# Patient Record
Sex: MALE | Race: WHITE | URBAN - METROPOLITAN AREA
[De-identification: names, ages, dates, MRNs, and addresses within clinical notes are randomized per-mention and may not be internally consistent; named-entity substitution may affect disease eponyms.]

---

## 2017-10-10 LAB
BILIRUB SERPL-MCNC: NEGATIVE MG/DL
BLOOD URINE, POC: NEGATIVE
CLARITY, POC UA: CLEAR
COLOR, POC UA: NORMAL
GLUCOSE UR QL STRIP: NEGATIVE
KETONES UR QL STRIP: NEGATIVE
LEUKOCYTE EST, POC UA: NEGATIVE
NITRITE, POC UA: NEGATIVE
PH, POC UA: 6
PROTEIN, POC: NEGATIVE
SPECIFIC GRAVITY, POC UA: 1
UROBILINOGEN, POC UA: NORMAL

## 2020-07-08 ENCOUNTER — HISTORICAL (OUTPATIENT)
Dept: ADMINISTRATIVE | Facility: HOSPITAL | Age: 45
End: 2020-07-08

## 2020-07-16 ENCOUNTER — HISTORICAL (OUTPATIENT)
Dept: RADIOLOGY | Facility: HOSPITAL | Age: 45
End: 2020-07-16

## 2021-05-14 ENCOUNTER — HOSPITAL ENCOUNTER (OUTPATIENT)
Dept: MEDSURG UNIT | Facility: HOSPITAL | Age: 46
End: 2021-05-15
Attending: INTERNAL MEDICINE | Admitting: INTERNAL MEDICINE

## 2021-05-14 LAB
ABS NEUT (OLG): 12.88 X10(3)/MCL (ref 2.1–9.2)
ALBUMIN SERPL-MCNC: 1.6 GM/DL (ref 3.5–5)
ALBUMIN/GLOB SERPL: 0.4 RATIO (ref 1.1–2)
ALP SERPL-CCNC: 230 UNIT/L (ref 40–150)
ALP SERPL-CCNC: 239 UNIT/L (ref 40–150)
ALP SERPL-CCNC: 241 UNIT/L (ref 40–150)
ALT SERPL-CCNC: 104 UNIT/L (ref 0–55)
ALT SERPL-CCNC: 88 UNIT/L (ref 0–55)
ALT SERPL-CCNC: 97 UNIT/L (ref 0–55)
AMPHET UR QL SCN: NEGATIVE
APPEARANCE, UA: ABNORMAL
AST SERPL-CCNC: 84 UNIT/L (ref 5–34)
AST SERPL-CCNC: 92 UNIT/L (ref 5–34)
AST SERPL-CCNC: 98 UNIT/L (ref 5–34)
BACTERIA #/AREA URNS AUTO: ABNORMAL /HPF
BARBITURATE SCN PRESENT UR: NEGATIVE
BASOPHILS # BLD AUTO: 0 X10(3)/MCL (ref 0–0.2)
BASOPHILS NFR BLD AUTO: 0 %
BENZODIAZ UR QL SCN: NEGATIVE
BILIRUB SERPL-MCNC: 2.1 MG/DL
BILIRUB SERPL-MCNC: 2.2 MG/DL
BILIRUB SERPL-MCNC: 2.3 MG/DL
BILIRUB UR QL STRIP: NEGATIVE
BILIRUBIN DIRECT+TOT PNL SERPL-MCNC: 0.5 MG/DL (ref 0–0.8)
BILIRUBIN DIRECT+TOT PNL SERPL-MCNC: 0.5 MG/DL (ref 0–0.8)
BILIRUBIN DIRECT+TOT PNL SERPL-MCNC: 0.8 MG/DL (ref 0–0.8)
BILIRUBIN DIRECT+TOT PNL SERPL-MCNC: 1.4 MG/DL (ref 0–0.5)
BILIRUBIN DIRECT+TOT PNL SERPL-MCNC: 1.6 MG/DL (ref 0–0.5)
BILIRUBIN DIRECT+TOT PNL SERPL-MCNC: 1.8 MG/DL (ref 0–0.5)
BUN SERPL-MCNC: 29.9 MG/DL (ref 8.9–20.6)
BUN SERPL-MCNC: 30.1 MG/DL (ref 8.9–20.6)
BUN SERPL-MCNC: 32 MG/DL (ref 8.9–20.6)
CALCIUM SERPL-MCNC: 7 MG/DL (ref 8.4–10.2)
CALCIUM SERPL-MCNC: 7.1 MG/DL (ref 8.4–10.2)
CALCIUM SERPL-MCNC: 7.1 MG/DL (ref 8.4–10.2)
CANNABINOIDS UR QL SCN: NEGATIVE
CHLORIDE SERPL-SCNC: 88 MMOL/L (ref 98–107)
CHLORIDE SERPL-SCNC: 92 MMOL/L (ref 98–107)
CHLORIDE SERPL-SCNC: 94 MMOL/L (ref 98–107)
CO2 SERPL-SCNC: 20 MMOL/L (ref 22–29)
CO2 SERPL-SCNC: 22 MMOL/L (ref 22–29)
CO2 SERPL-SCNC: 22 MMOL/L (ref 22–29)
COCAINE UR QL SCN: NEGATIVE
COLOR UR: YELLOW
CREAT SERPL-MCNC: 1.1 MG/DL (ref 0.73–1.18)
CREAT SERPL-MCNC: 1.13 MG/DL (ref 0.73–1.18)
CREAT SERPL-MCNC: 1.23 MG/DL (ref 0.73–1.18)
EOSINOPHIL # BLD AUTO: 0 X10(3)/MCL (ref 0–0.9)
EOSINOPHIL NFR BLD AUTO: 0 %
ERYTHROCYTE [DISTWIDTH] IN BLOOD BY AUTOMATED COUNT: 14.6 % (ref 11.5–14.5)
ETHANOL SERPL-MCNC: <10 MG/DL
GLOBULIN SER-MCNC: 3.7 GM/DL (ref 2.4–3.5)
GLOBULIN SER-MCNC: 3.9 GM/DL (ref 2.4–3.5)
GLOBULIN SER-MCNC: 4 GM/DL (ref 2.4–3.5)
GLUCOSE (UA): NEGATIVE
GLUCOSE SERPL-MCNC: 84 MG/DL (ref 74–100)
GLUCOSE SERPL-MCNC: 92 MG/DL (ref 74–100)
GLUCOSE SERPL-MCNC: 93 MG/DL (ref 74–100)
HCO3 UR-SCNC: 22.9 MMOL/L (ref 22–26)
HCT VFR BLD AUTO: 29.8 % (ref 40–51)
HGB BLD-MCNC: 10.2 GM/DL (ref 13.5–17.5)
HGB UR QL STRIP: 0.5
HYALINE CASTS #/AREA URNS LPF: ABNORMAL /LPF
IMM GRANULOCYTES # BLD AUTO: 0.44 10*3/UL
IMM GRANULOCYTES NFR BLD AUTO: 3 %
KETONES UR QL STRIP: NEGATIVE
LACTATE SERPL-SCNC: 1.8 MMOL/L (ref 0.5–2.2)
LEUKOCYTE ESTERASE UR QL STRIP: 75 LEU/UL
LYMPHOCYTES # BLD AUTO: 1.2 X10(3)/MCL (ref 0.6–4.6)
LYMPHOCYTES NFR BLD AUTO: 8 %
MAGNESIUM SERPL-MCNC: 2.2 MG/DL (ref 1.6–2.6)
MCH RBC QN AUTO: 25.6 PG (ref 26–34)
MCHC RBC AUTO-ENTMCNC: 34.2 GM/DL (ref 31–37)
MCV RBC AUTO: 74.7 FL (ref 80–100)
MONOCYTES # BLD AUTO: 0.9 X10(3)/MCL (ref 0.1–1.3)
MONOCYTES NFR BLD AUTO: 6 %
NEUTROPHILS # BLD AUTO: 12.88 X10(3)/MCL (ref 2.1–9.2)
NEUTROPHILS NFR BLD AUTO: 83 %
NITRITE UR QL STRIP: NEGATIVE
O2 HGB ARTERIAL: 96.4 % (ref 94–100)
OPIATES UR QL SCN: POSITIVE
PCO2 BLDA: 28 MMHG (ref 35–45)
PCP UR QL: NEGATIVE
PH SMN: 7.52 [PH] (ref 7.35–7.45)
PH UR STRIP.AUTO: 5.5 [PH] (ref 3–11)
PH UR STRIP: 5.5 [PH] (ref 4.5–8)
PHOSPHATE SERPL-MCNC: 3.6 MG/DL (ref 2.3–4.7)
PLATELET # BLD AUTO: 174 X10(3)/MCL (ref 130–400)
PMV BLD AUTO: 10.9 FL (ref 7.4–10.4)
PO2 BLDA: 84 MMHG (ref 80–100)
POC ALLENS TEST: POSITIVE
POC BE: 0.6 (ref -2–2)
POC CO HGB: 1.5 %
POC CO2: 23.8 MMOL/L (ref 22–26)
POC MET HGB: 0.9 % (ref 0.4–1.5)
POC SAMPLESOURCE: ABNORMAL
POC SATURATED O2: 98.8 %
POC SITE: ABNORMAL
POC THB: 10 GM/DL (ref 12–16)
POC TREATMENT: ABNORMAL
POTASSIUM SERPL-SCNC: 3.7 MMOL/L (ref 3.5–5.1)
POTASSIUM SERPL-SCNC: 3.8 MMOL/L (ref 3.5–5.1)
POTASSIUM SERPL-SCNC: 4.1 MMOL/L (ref 3.5–5.1)
PROT SERPL-MCNC: 5.3 GM/DL (ref 6.4–8.3)
PROT SERPL-MCNC: 5.5 GM/DL (ref 6.4–8.3)
PROT SERPL-MCNC: 5.6 GM/DL (ref 6.4–8.3)
PROT UR QL STRIP: 20 MG/DL
RBC # BLD AUTO: 3.99 X10(6)/MCL (ref 4.5–5.9)
RBC #/AREA URNS AUTO: ABNORMAL /HPF
SARS-COV-2 AG RESP QL IA.RAPID: NEGATIVE
SODIUM SERPL-SCNC: 121 MMOL/L (ref 136–145)
SODIUM SERPL-SCNC: 123 MMOL/L (ref 136–145)
SODIUM SERPL-SCNC: 124 MMOL/L (ref 136–145)
SP GR UR STRIP: 1 (ref 1–1.03)
SQUAMOUS #/AREA URNS LPF: ABNORMAL /LPF
TROPONIN I SERPL-MCNC: 0.21 NG/ML (ref 0–0.04)
TROPONIN I SERPL-MCNC: 0.27 NG/ML (ref 0–0.04)
UROBILINOGEN UR STRIP-ACNC: 2 MG/DL
WBC # SPEC AUTO: 15.4 X10(3)/MCL (ref 4.5–11)
WBC #/AREA URNS AUTO: ABNORMAL /HPF

## 2021-05-15 LAB
ABS NEUT (OLG): 13.56 X10(3)/MCL (ref 2.1–9.2)
ALBUMIN % SPEP (OHS): 32.42 % (ref 48.1–59.5)
ALBUMIN SERPL-MCNC: 1.6 GM/DL (ref 3.5–5)
ALBUMIN/GLOB SERPL: 0.4 RATIO (ref 1.1–2)
ALP SERPL-CCNC: 183 UNIT/L (ref 40–150)
ALP SERPL-CCNC: 196 UNIT/L (ref 40–150)
ALP SERPL-CCNC: 208 UNIT/L (ref 40–150)
ALPHA 1 GLOB (OHS): 0.42 GM/DL (ref 0–0.4)
ALPHA 1 GLOB% (OHS): 7.48 % (ref 2.3–4.9)
ALPHA 2 GLOB % (OHS): 16.1 % (ref 6.9–13)
ALPHA 2 GLOB (OHS): 0.9 GM/DL (ref 0.4–1)
ALT SERPL-CCNC: 65 UNIT/L (ref 0–55)
ALT SERPL-CCNC: 70 UNIT/L (ref 0–55)
ALT SERPL-CCNC: 81 UNIT/L (ref 0–55)
AMMONIA PLAS-MSCNC: 32 UMOL/L (ref 18–72)
APTT PPP: 37.3 SECOND(S) (ref 23.3–37)
AST SERPL-CCNC: 63 UNIT/L (ref 5–34)
AST SERPL-CCNC: 64 UNIT/L (ref 5–34)
AST SERPL-CCNC: 72 UNIT/L (ref 5–34)
BASOPHILS NFR BLD MANUAL: 0 %
BETA GLOB% (OHS): 14.04 % (ref 13.8–19.7)
BILIRUB SERPL-MCNC: 1.8 MG/DL
BILIRUB SERPL-MCNC: 1.8 MG/DL
BILIRUB SERPL-MCNC: 1.9 MG/DL
BILIRUBIN DIRECT+TOT PNL SERPL-MCNC: 0.5 MG/DL (ref 0–0.8)
BILIRUBIN DIRECT+TOT PNL SERPL-MCNC: 0.5 MG/DL (ref 0–0.8)
BILIRUBIN DIRECT+TOT PNL SERPL-MCNC: 0.7 MG/DL (ref 0–0.8)
BILIRUBIN DIRECT+TOT PNL SERPL-MCNC: 1.2 MG/DL (ref 0–0.5)
BILIRUBIN DIRECT+TOT PNL SERPL-MCNC: 1.3 MG/DL (ref 0–0.5)
BILIRUBIN DIRECT+TOT PNL SERPL-MCNC: 1.3 MG/DL (ref 0–0.5)
BUN SERPL-MCNC: 24.1 MG/DL (ref 8.9–20.6)
BUN SERPL-MCNC: 25.3 MG/DL (ref 8.9–20.6)
BUN SERPL-MCNC: 28.3 MG/DL (ref 8.9–20.6)
CALCIUM SERPL-MCNC: 6.9 MG/DL (ref 8.4–10.2)
CALCIUM SERPL-MCNC: 7.1 MG/DL (ref 8.4–10.2)
CALCIUM SERPL-MCNC: 7.2 MG/DL (ref 8.4–10.2)
CHLORIDE SERPL-SCNC: 94 MMOL/L (ref 98–107)
CHLORIDE SERPL-SCNC: 97 MMOL/L (ref 98–107)
CHLORIDE SERPL-SCNC: 99 MMOL/L (ref 98–107)
CHLORIDE UR-SCNC: <20 MMOL/L
CO2 SERPL-SCNC: 15 MMOL/L (ref 22–29)
CO2 SERPL-SCNC: 21 MMOL/L (ref 22–29)
CO2 SERPL-SCNC: 21 MMOL/L (ref 22–29)
CREAT SERPL-MCNC: 0.96 MG/DL (ref 0.73–1.18)
CREAT SERPL-MCNC: 0.98 MG/DL (ref 0.73–1.18)
CREAT SERPL-MCNC: 1 MG/DL (ref 0.73–1.18)
CREAT SERPL-MCNC: 1.07 MG/DL (ref 0.73–1.18)
CREAT UR-MCNC: 44.7 MG/DL (ref 58–161)
EOSINOPHIL NFR BLD MANUAL: 0 %
ERYTHROCYTE [DISTWIDTH] IN BLOOD BY AUTOMATED COUNT: 15 % (ref 11.5–14.5)
FESODIUM % (OHS): <1 %
GAMMA GLOBULIN % (OHS): 29.97 % (ref 10.1–21.9)
GAMMA GLOBULIN (OHS): 1.68 GM/DL (ref 0.4–1.8)
GLOBULIN SER-MCNC: 3.7 GM/DL (ref 2.4–3.5)
GLOBULIN SER-MCNC: 3.8 GM/DL (ref 2.4–3.5)
GLOBULIN SER-MCNC: 3.9 GM/DL (ref 2.4–3.5)
GLOBULIN SER-MCNC: 4 GM/DL (ref 2.4–3.5)
GLUCOSE SERPL-MCNC: 100 MG/DL (ref 74–100)
GLUCOSE SERPL-MCNC: 119 MG/DL (ref 74–100)
GLUCOSE SERPL-MCNC: 121 MG/DL (ref 74–100)
GRANULOCYTES NFR BLD MANUAL: 86 % (ref 43–75)
HAV IGM SERPL QL IA: NONREACTIVE
HBV CORE IGM SERPL QL IA: NONREACTIVE
HBV SURFACE AG SERPL QL IA: REACTIVE
HBV SURFACE AG SERPLBLD QL IA.RAPID: NORMAL
HCO3 UR-SCNC: 19.9 MMOL/L (ref 22–26)
HCT VFR BLD AUTO: 31.7 % (ref 40–51)
HCV AB SERPL QL IA: REACTIVE
HGB BLD-MCNC: 10.6 GM/DL (ref 13.5–17.5)
HIV 1+2 AB+HIV1 P24 AG SERPL QL IA: NONREACTIVE
IGA SERPL-MCNC: 241 MG/DL (ref 63–484)
IGG SERPL-MCNC: 1377 MG/DL (ref 240–1822)
IGM SERPL-MCNC: 234 MG/DL (ref 22–240)
LACTATE SERPL-SCNC: 1.8 MMOL/L (ref 0.5–2.2)
LYMPHOCYTES NFR BLD MANUAL: 8 % (ref 20.5–51.1)
M SPIKE % (OHS): 12.35 %
M SPIKE (OHS): 0.69 GM/DL
MCH RBC QN AUTO: 25.4 PG (ref 26–34)
MCHC RBC AUTO-ENTMCNC: 33.4 GM/DL (ref 31–37)
MCV RBC AUTO: 75.8 FL (ref 80–100)
MONOCYTES NFR BLD MANUAL: 6 % (ref 2–9)
O2 HGB ARTERIAL: 96.1 % (ref 94–100)
OSMOLALITY SERPL: 266 MOSM/KG (ref 280–300)
OSMOLALITY UR: 279 MOSM/KG (ref 300–1300)
PCO2 BLDA: 25 MMHG (ref 35–45)
PH SMN: 7.51 [PH] (ref 7.35–7.45)
PLATELET # BLD AUTO: 124 X10(3)/MCL (ref 130–400)
PLATELET # BLD EST: ABNORMAL 10*3/UL
PMV BLD AUTO: ABNORMAL FL (ref 7.4–10.4)
PO2 BLDA: 72 MMHG (ref 80–100)
POC ALLENS TEST: POSITIVE
POC BE: -2.1 (ref -2–2)
POC CO HGB: 2.6 %
POC CO2: 20.7 MMOL/L (ref 22–26)
POC MET HGB: 0.6 % (ref 0.4–1.5)
POC SAMPLESOURCE: ABNORMAL
POC SATURATED O2: 99.3 %
POC SITE: ABNORMAL
POC THB: 9.9 GM/DL (ref 12–16)
POC TREATMENT: ABNORMAL
POTASSIUM SERPL-SCNC: 3.6 MMOL/L (ref 3.5–5.1)
POTASSIUM SERPL-SCNC: 3.6 MMOL/L (ref 3.5–5.1)
POTASSIUM SERPL-SCNC: 4.3 MMOL/L (ref 3.5–5.1)
PROT SERPL-MCNC: 5.3 GM/DL (ref 6.4–8.3)
PROT SERPL-MCNC: 5.4 GM/DL (ref 6.4–8.3)
PROT SERPL-MCNC: 5.5 GM/DL (ref 6.4–8.3)
PROT SERPL-MCNC: 5.6 GM/DL (ref 6.4–8.3)
RBC # BLD AUTO: 4.18 X10(6)/MCL (ref 4.5–5.9)
RBC MORPH BLD: NORMAL
SODIUM SERPL-SCNC: 124 MMOL/L (ref 136–145)
SODIUM SERPL-SCNC: 124 MMOL/L (ref 136–145)
SODIUM SERPL-SCNC: 126 MMOL/L (ref 136–145)
SODIUM SERPL-SCNC: 129 MMOL/L (ref 136–145)
SODIUM UR-SCNC: <20 MMOL/L
SODIUM UR-SCNC: <20 MMOL/L
SPE INTERPRETATION (OHS): ABNORMAL
T PALLIDUM AB SER QL: NONREACTIVE
TROPONIN I SERPL-MCNC: 0.17 NG/ML (ref 0–0.04)
TROPONIN I SERPL-MCNC: 0.17 NG/ML (ref 0–0.04)
TSH SERPL-ACNC: 0.76 UIU/ML (ref 0.35–4.94)
VANCOMYCIN SERPL-MCNC: 14.8 UG/ML (ref 15–20)
WBC # SPEC AUTO: 16.4 X10(3)/MCL (ref 4.5–11)

## 2022-04-11 ENCOUNTER — HISTORICAL (OUTPATIENT)
Dept: ADMINISTRATIVE | Facility: HOSPITAL | Age: 47
End: 2022-04-11

## 2022-04-27 VITALS
HEIGHT: 68 IN | SYSTOLIC BLOOD PRESSURE: 154 MMHG | DIASTOLIC BLOOD PRESSURE: 114 MMHG | BODY MASS INDEX: 32.74 KG/M2 | OXYGEN SATURATION: 100 % | WEIGHT: 216 LBS

## 2022-04-30 NOTE — ED PROVIDER NOTES
Patient:   Daniel Gardner            MRN: [id number]            FIN: [id number]               Age:   45 years     Sex:  Male     :  1975   Associated Diagnoses:   Polysubstance abuse; Hyponatremia; Anemia; Abnormal LFTs (liver function tests)   Author:   Kaleb CALLE, Yoshi Stanton      Basic Information   Time seen: Date & time 2021 11:00:00.   History source: Patient, EMS.   Arrival mode: Ambulance.   History limitation: Clinical condition.   Additional information: Chief Complaint from Nursing Triage Note : Chief Complaint   2021 10:37 CDT      Chief Complaint           Pt reports SOB, nausea, and hasn't used drugs a few days.  .      History of Present Illness   The patient presents with drug withdrawal.  Poor historian, regular user of inhaled methamphetamine and injected heroin, uncertain if the use drugs earlier today, apparently lives with his mother who called for ambulance assistance based on unspecified problems at home.  The patient is alert on arrival with signs of intoxication but denies specific complaints.  Blood pressure noted initially borderline but there was no report of decreased responsiveness, there is some report of dyspnea, nausea, and he seems confused.  Denies fever, chills, pain, nausea at present, dyspnea at present, or other specific complaints.  Denies intent for self-harm.  Denies hallucination or delusion..        Review of Systems   Constitutional symptoms:  Negative except as documented in HPI, no fever, no chills, no weakness.    Skin symptoms:  Negative except as documented in HPI, no rash, no breakdown.    Eye symptoms:  Negative except as documented in HPI, no recent vision problems, no pain.    ENMT symptoms:  Negative except as documented in HPI, no ear pain, no sore throat.    Respiratory symptoms:  Shortness of breath, See above, no cough, no wheezing.    Cardiovascular symptoms:  Negative except as documented in HPI, no chest pain, no  palpitations, no syncope.    Gastrointestinal symptoms:  Negative except as documented in HPI, no abdominal pain, no nausea, no vomiting, no diarrhea.    Genitourinary symptoms:  Negative except as documented in HPI, no dysuria, no hematuria.    Musculoskeletal symptoms:  Negative except as documented in HPI, no Muscle pain, no Joint pain.    Neurologic symptoms:  Negative except as documented in HPI, Confusion, no altered level of consciousness, no weakness.    Psychiatric symptoms:  Substance abuse, no anxiety, no depression.    Endocrine symptoms:  Negative except as documented in HPI, no polyuria, no polydipsia.    Hematologic/Lymphatic symptoms:  Negative except as documented in HPI, bleeding tendency negative, bruising tendency negative.    Allergy/immunologic symptoms:  Negative except as documented in HPI, no recurrent infections, no impaired immunity.              Additional review of systems information: All other systems reviewed and otherwise negative, All systems reviewed as documented in chart.      Health Status   Allergies:    Allergic Reactions (Selected)  Severity Not Documented  Bactrim- Blisters on feet.,    Allergies (1) Active Reaction  Bactrim blisters on feet  .   Medications:  (Selected)   Prescriptions  Prescribed  diclofenac sodium 75 mg oral delayed release tablet: 75 mg = 1 tab(s), Oral, BID, # 28 tab(s), 0 Refill(s), Pharmacy: [id number], 172.72, cm, Height/Length Dosing, 03/25/21 22:39:00 CDT, 95, kg, Weight Dosing, 03/25/21 22:39:00 CDT  Documented Medications  Documented  cloniDINE 0.2 mg oral tablet: 0.2 mg = 1 tab(s), Oral, TID  methocarbamol 500 mg oral tablet: 1000 mg = 2 tab(s), Oral, TID.      Past Medical/ Family/ Social History   Medical history:    Resolved  Tobacco user ([id number]):  Resolved.  Obesity ([id number]):  Resolved..   Surgical history:    Tonsillectomy ([id number]).  Surgical removal of wisdom tooth ([id number])..   Family history:     Diabetes......  Mother  ALS (amyotrophic lateral sclerosis).....  Father  .   Social history:    Social & Psychosocial Habits    Alcohol  09/04/2017  Use: Never    Employment/School  06/29/2020  Status: Employed    Exercise  06/29/2020  Duration (average number of minutes): 0    Home/Environment  09/04/2017  Living situation: Home/Independent    Nutrition/Health  06/29/2020  Home Diet Regular    Sexual  06/29/2020  What is your current gender identity? (Check all that apply) Identifies as male    Substance Use  03/25/2021  Type: Methamphetamines    Comment: ABOUT  1  WEEK  [facility] - 03/25/2021 22:42 - Oscar CASTILLO, Chetna Stratton    Tobacco  03/25/2021  Use: 10 or more cigarettes (1/    Patient Wants Consult For Cessation Counseling No    05/14/2021  Use: 10 or more cigarettes (1/    Patient Wants Consult For Cessation Counseling No    Abuse/Neglect  03/25/2021  SHX Any signs of abuse or neglect No    05/14/2021  SHX Any signs of abuse or neglect No    Feels unsafe at home: No    Safe place to go: Yes    Spiritual/Cultural  06/29/2020  Congregational Preference None  .   Problem list:    Active Problems (5)  Amyotrophic lateral sclerosis (ALS)   Lumbar myelopathy   Obesity   Spinal stenosis of lumbar region at multiple levels   Tobacco user   .      Physical Examination               Vital Signs   Vital Signs   5/14/2021 10:50 CDT      Oxygen Therapy            Room air    5/14/2021 10:37 CDT      Temperature Oral          37 DegC                             Temperature Oral (calculated)             98.60 DegF                             Peripheral Pulse Rate     95 bpm                             Respiratory Rate          16 br/min                             SpO2                      99 %                             Oxygen Therapy            Room air                             Systolic Blood Pressure   94 mmHg                             Diastolic Blood Pressure  50 mmHg  LOW  .   Basic Oxygen Information   5/14/2021 10:50  CDT      Oxygen Therapy            Room air    5/14/2021 10:37 CDT      SpO2                      99 %                             Oxygen Therapy            Room air  .   General:  Alert, no acute distress.    Skin:  Warm, dry, normal for ethnicity, Extensive recent and old needle track marks both upper extremities, none appear thrombosed or infected.    Head:  Normocephalic, atraumatic.    Neck:  Supple, trachea midline, no tenderness.    Eye:  Extraocular movements are intact, normal conjunctiva, Pupils pinpoint and equal.    Ears, nose, mouth and throat:  Oral mucosa moist, no pharyngeal erythema or exudate.    Cardiovascular:  Regular rate and rhythm, No murmur, Normal peripheral perfusion, No edema.    Respiratory:  Lungs are clear to auscultation, respirations are non-labored, breath sounds are equal, Symmetrical chest wall expansion.    Chest wall:  No tenderness, No deformity.    Back:  Nontender, Normal range of motion, Normal alignment.    Musculoskeletal:  Normal ROM, normal strength, no tenderness, no swelling, no deformity.    Gastrointestinal:  Soft, Nontender, Non distended, Normal bowel sounds.    Neurological:  No focal neurological deficit observed, CN II-XII intact, normal motor observed, normal speech observed, Mildly disoriented consistent with intoxication.  No lateralizing findings., Not Alert and oriented to person, place, time, and situation,    Lymphatics:  No lymphadenopathy.   Psychiatric:  Cooperative, non-suicidal, Alert and oriented to name, year, location.  Some confusion as to circumstances leading to today's ambulance trip.  Poor insight and judgment, no apparent delusion, hallucination, suicidal ideation, or homicidal ideation., not appropriate mood & affect, not normal judgment.       Medical Decision Making   Documents reviewed:  Emergency department nurses' notes, emergency department records, prior records.    Orders  Launch Orders   Laboratory:  EtOH Level (Order): Stat  collect, 5/14/2021 11:30 CDT, Blood, Lab Collect, 5/14/2021 11:30 CDT  Urine Drug Screen [facility] (Order): Stat collect, Urine, 5/14/2021 11:29 CDT, Nurse collect, 5/14/2021 11:29 CDT  Urinalysis with Micro [facility] (Order): Stat collect, Urine, 5/14/2021 11:29 CDT, Nurse collect  CMP (Order): Stat collect, 5/14/2021 11:29 CDT, Blood, Lab Collect, 5/14/2021 11:29 CDT  CBC w/ Auto Diff (Order): Now collect, 5/14/2021 11:29 CDT, Blood, Lab Collect, 5/14/2021 11:29 CDT  Patient Care:  Give info re: local substance abuse info (Order): 5/14/2021 11:30 CDT, Give info re: local substance abuse info  Cardiac Monitoring (Order): 5/14/2021 11:30 CDT, Constant Order  Pulse Oximetry (Order): 5/14/2021 11:30 CDT  Seizure Precautions (Order): 5/14/2021 11:29 CDT, Constant Order  Fall/ aspiration precautions (Order): 5/14/2021 11:29 CDT, Fall/ aspiration precautions  Saline Lock Insert (Order): 5/14/2021 11:29 CDT  Pharmacy:  Lactated Ringers 1000ml 2,000 mL (Order): 2,000 mL, 2,000 mL, IV, 1,000 mL/hr, start date 5/14/2021 11:29 CDT, 2.17, m2  .   Electrocardiogram:  Time 5/14/2021 15:32:00, rate 93, normal sinus rhythm, No ST-T changes, no ectopy, EP Interp, Noisy baseline/ prolonged QT.    Results review:  Lab results : Lab View   5/14/2021 12:52 CDT      U pH                      5.5                             UA Appear                 Hazy                             UA Color                  YELLOW                             UA Spec Grav              1.002  LOW                             UA Bili                   Negative                             UA pH                     5.5                             UA Urobilinogen           2 mg/dL                             UA Blood                  0.5                             UA Glucose                Negative                             UA Ketones                Negative                             UA Protein                20 mg/dL                             UA Nitrite                 Negative                             UA Leuk Est               75 Severiano/uL                             U Amph Scr                Negative                             U Marge Scr                Negative                             U Benzodia Scr            Negative                             U Cannab Scr              Negative                             U Cocaine Scr             Negative                             U Opiate Scr              Positive                             U Phencyclidine Scr       Negative    5/14/2021 12:37 CDT      Est Creat Clearance Ser   76.85 mL/min    5/14/2021 12:09 CDT      Sodium Lvl                121 mmol/L  LOW                             Potassium Lvl             3.7 mmol/L                             Chloride                  88 mmol/L  LOW                             CO2                       22 mmol/L                             Calcium Lvl               7.1 mg/dL  LOW                             Glucose Lvl               84 mg/dL                             BUN                       32.0 mg/dL  HI                             Creatinine                1.23 mg/dL  HI                             eGFR-AA                   81  LOW                             eGFR-MARK                  67 mL/min/1.73 m2  LOW                             Bili Total                2.3 mg/dL  HI                             Bili Direct               1.8 mg/dL  HI                             Bili Indirect             0.50 mg/dL                             AST                       98 unit/L  HI                             ALT                       104 unit/L  HI                             Alk Phos                  239 unit/L  HI                             Total Protein             5.5 gm/dL  LOW                             Albumin Lvl               1.6 gm/dL  LOW                             Globulin                  3.9 gm/dL  HI                             A/G Ratio                 0.4 ratio  LOW                              WBC                       15.4 x10(3)/mcL  HI                             RBC                       3.99 x10(6)/mcL  LOW                             Hgb                       10.2 gm/dL  LOW                             Hct                       29.8 %  LOW                             Platelet                  174 x10(3)/mcL                             MCV                       74.7 fL  LOW                             MCH                       25.6 pg  LOW                             MCHC                      34.2 gm/dL                             RDW                       14.6 %  HI                             MPV                       10.9 fL  HI                             Abs Neut                  12.88 x10(3)/mcL  HI                             Neutro Auto               83 %  NA                             Lymph Auto                8 %  NA                             Mono Auto                 6 %  NA                             Eos Auto                  0 %  NA                             Abs Eos                   0.0 x10(3)/mcL                             Basophil Auto             0 %  NA                             Abs Neutro                12.88 x10(3)/mcL  HI                             Abs Lymph                 1.2 x10(3)/mcL                             Abs Mono                  0.9 x10(3)/mcL                             Abs Baso                  0.0 x10(3)/mcL                             IG%                       3 %  NA                             IG#                       0.440  NA                             Ethanol Lvl               <10.0 mg/dL  NA    .      Reexamination/ Reevaluation   Time: 5/14/2021 13:49:00 .   Vital signs   results included from flowsheet : Vital Signs   5/14/2021 13:35 CDT      Peripheral Pulse Rate     88 bpm                             Heart Rate Monitored      90 bpm                             Respiratory Rate          16 br/min                              SpO2                      95 %                             Oxygen Therapy            Room air                             Systolic Blood Pressure   93 mmHg                             Diastolic Blood Pressure  56 mmHg  LOW                             Mean Arterial Pressure, Cuff              68 mmHg    5/14/2021 13:30 CDT      Peripheral Pulse Rate     93 bpm                             Heart Rate Monitored      90 bpm                             Respiratory Rate          15 br/min                             SpO2                      97 %                             Oxygen Therapy            Room air, Vapotherm                             Systolic Blood Pressure   98 mmHg                             Diastolic Blood Pressure  37 mmHg  LOW                             Mean Arterial Pressure, Cuff              57 mmHg    5/14/2021 13:15 CDT      Peripheral Pulse Rate     98 bpm                             Heart Rate Monitored      91 bpm                             Respiratory Rate          15 br/min                             SpO2                      99 %                             Oxygen Therapy            Room air                             Systolic Blood Pressure   95 mmHg                             Diastolic Blood Pressure  59 mmHg  LOW                             Mean Arterial Pressure, Cuff              71 mmHg    5/14/2021 13:00 CDT      Peripheral Pulse Rate     97 bpm                             Heart Rate Monitored      93 bpm                             Respiratory Rate          16 br/min                             SpO2                      92 %  LOW                             Oxygen Therapy            Room air                             Systolic Blood Pressure   89 mmHg  LOW                             Diastolic Blood Pressure  48 mmHg  LOW                             Mean Arterial Pressure, Cuff              62 mmHg    5/14/2021 12:45 CDT      Peripheral Pulse Rate     96 bpm                              Heart Rate Monitored      98 bpm                             Respiratory Rate          21 br/min                             SpO2                      98 %                             Oxygen Therapy            Room air                             Systolic Blood Pressure   99 mmHg                             Diastolic Blood Pressure  51 mmHg  LOW                             Mean Arterial Pressure, Cuff              67 mmHg     Course: unchanged.   Pain status: unchanged.   Assessment: exam unchanged.      Impression and Plan   Diagnosis   Polysubstance abuse (RKD31-XB F19.10)   Hyponatremia (SPO93-UZ E87.1)   Anemia (NLU76-NO D64.9)   Abnormal LFTs (liver function tests) (HNU63-EH R94.5)      Calls-Consults   -  5/14/2021 13:50:00 , Int. Med. - to see in ER.    Plan   Disposition: Admit time  5/14/2021 13:50:00, Int. Med. - to see and admit.

## 2022-09-22 ENCOUNTER — HISTORICAL (OUTPATIENT)
Dept: ADMINISTRATIVE | Facility: HOSPITAL | Age: 47
End: 2022-09-22